# Patient Record
Sex: MALE | Race: WHITE | NOT HISPANIC OR LATINO
[De-identification: names, ages, dates, MRNs, and addresses within clinical notes are randomized per-mention and may not be internally consistent; named-entity substitution may affect disease eponyms.]

---

## 2021-06-15 ENCOUNTER — TRANSCRIPTION ENCOUNTER (OUTPATIENT)
Age: 51
End: 2021-06-15

## 2023-05-15 ENCOUNTER — APPOINTMENT (OUTPATIENT)
Dept: ORTHOPEDIC SURGERY | Facility: CLINIC | Age: 53
End: 2023-05-15
Payer: MEDICARE

## 2023-05-15 DIAGNOSIS — M25.811 OTHER SPECIFIED JOINT DISORDERS, RIGHT SHOULDER: ICD-10-CM

## 2023-05-15 DIAGNOSIS — M75.51 BURSITIS OF RIGHT SHOULDER: ICD-10-CM

## 2023-05-15 PROCEDURE — 99214 OFFICE O/P EST MOD 30 MIN: CPT | Mod: 25

## 2023-05-15 PROCEDURE — 20611 DRAIN/INJ JOINT/BURSA W/US: CPT | Mod: RT

## 2023-05-15 PROCEDURE — 99204 OFFICE O/P NEW MOD 45 MIN: CPT | Mod: 25,57

## 2023-05-15 RX ORDER — METHYLPREDNISOLONE 4 MG/1
4 TABLET ORAL
Qty: 1 | Refills: 0 | Status: ACTIVE | COMMUNITY
Start: 2023-05-15 | End: 1900-01-01

## 2023-05-15 NOTE — PHYSICAL EXAM
[de-identified] : Constitutional: The general appearance of the patient is well developed, well nourished, no deformities and well groomed. Normal\par \par Gait: Gait and function is as follows: normal gait.\par \par Skin: Head and neck visualized skin is normal. Left upper extremity visualized skin is normal. Right upper extremity visualized skin is normal. Thoracic Skin of the thoracic spine shows visualized skin is normal.\par \par Cardiovascular: palpable radial pulse bilaterally, good capillary refill in digits of the bilateral upper extremities and no temperature or color changes in the bilateral upper extremities.\par \par Lymphatic: Normal Palpation of lymph nodes in the cervical.\par \par Neurologic: fine motor control in the bilateral upper extremities is intact. Deep Tendon Reflexes in Upper and Lower Extremities Negative Delacruz's in the bilateral upper extremities. The patient is oriented to time, place and person. Sensation to light touch intact in the bilateral upper extremities. Mood and Affect is normal.\par \par Right Shoulder: Inspection of the shoulder/upper arm is as follows: no scapula winging, no biceps deformity and no AC joint deformity. Palpation of the shoulder/upper arm is as follows: There is tenderness at the proximal biceps tendon. Range of motion of the shoulder is as follows: Pain with internal rotation, external rotation, abduction and forward flexion. Strength of the shoulder is as follows: Supraspinatus 4/5. External Rotation 4/5. Internal Rotation 4/5. Deltoid 5/5 Ligament Stability and Special Tests of the shoulder is as follows: Neer test is positive. Bang' test is positive. Speed's test is positive\par \par Left Shoulder: Inspection of the shoulder/upper arm is as follows: There is a full, pain-free, stable range of motion of the shoulder with normal strength and no tenderness to palpation.\par \par Neck:\par \par Inspection / Palpation of the cervical spine is as follows: mild paracervical tenderness. Range of motion of the cervical spine is as follows: moderately decreased range of motion of the cervical spine. Stability testing for the cervical spine is as follows Stable range of motion.\par \par Back, including spine: Inspection / Palpation of the thoracic/lumbar spine is as follows: There is a full, pain free, stable range of motion of the thoracic spine with a normal tone and not tenderness to palpation.\par

## 2023-05-15 NOTE — HISTORY OF PRESENT ILLNESS
[de-identified] : This is a 53 yo male  presenting to the office c/o ongoing right shoulder pain for 4 months. No prior injury or trauma to shoulder. Pt had prior MRI done.  Pain is described as constant.  Pain will wake patient up from sleep. Patient reports pain has been getting progressively worse. Pain is worse at night. Overall pain does improve with rest and ice. Patient denies any numbness or tingling.Pt is currently in physical therapy since November but doesn't notice improvement. \par

## 2023-05-15 NOTE — PROCEDURE
[FreeTextEntry3] : Large Joint Injection was performed because of pain and inflammation.\par \par Anesthesia: ethyl chloride sprayed topically..\par \par Depomedrol: An injection of Depomedrol 40 mg , 1 cc.\par \par Lidocaine: 3 cc.\par \par Medication was injected in the right GH joint. Patient has tried OTC's including aspirin, Ibuprofen, Aleve etc or prescription NSAIDS, and/or exercises at home and/ or physical therapy without satisfactory response and Patient has decreased mobility in the joint. After verbal consent using sterile preparation and technique. The risks, benefits, and alternatives to cortisone injection were explained in full to the patient. Risks outlined include but are not limited to infection, sepsis, bleeding, scarring, skin discoloration, temporary increase in pain, syncopal episode, failure to resolve symptoms, allergic reaction, symptom recurrence, and elevation of blood sugar in diabetics. Patient understood the risks. All questions were answered. After discussion of options, patient requested an injection. Oral informed consent was obtained and sterile prep was done of the injection site. Sterile technique was utilized for the procedure including the preparation of the solutions used for the injection. Patient tolerated the procedure well. Advised to ice the injection site this evening. Prep with alcohol locally to site. Sterile technique used. Patient tolerated procedure well. Post Procedure Instructions: Patient was advised to call if redness, pain, or fever occur and apply ice for 15 min. out of every hour for the next 12-24 hours as tolerated. patient was advised to rest the joint(s) for 7 days.\par \par Ultrasound Guidance was used for the following reasons: prior failure or difficult injection.\par \par Ultrasound guided injection was performed of the shoulder, visualization of the needle and placement of injection was performed without complication.\par

## 2023-05-15 NOTE — DISCUSSION/SUMMARY
[de-identified] : RUE: Pain with max external and internal rotation limited about 10 degrees in both directions.\par \par This is a 53 yo male presenting to the office c/o ongoing right shoulder pain for 4 months\par Prior MRI reviewed as well as x-rays from [facility] showing no intrinsic shoulder pathology.\par \par ROM and strength is intact\par Capsular contracture of the shoulder seen on exam today \par MDP prescribed\par GH injection today for pain\par Follow up as needed showed sleeper stretch\par And external rotation stretch as well.\par \par \par \par (1) We discussed a comprehensive treatment plans that included a prescription management plan involving the use of prescription strength medications to include Ibuprofen 600-800 mg TID, versus 500-650 mg Tylenol. We also discussed prescribing topical diclofenac (Voltaren gel) as well as once daily Meloxicam 15 mg.\par (2) The patient has More Than One chronic injuries/illnesses as outlined, discussed, and documented by ICD 10 codes listed, as well as the HPI and Plan section.\par There is a moderate risk of morbidity with further treatment, especially from use of prescription strength medications and possible side effects of these medications which include upset stomach and cardiac/renal issues with long term use were discussed.\par (3) I recommended that the patient follow-up with their medical physician to discuss any significant specific potential issues with long term use such as interactions with current medications or with exacerbation of underlying medical morbidities. \par \par Attestation:\par I, Katlyn Sanchez , attest that this documentation has been prepared under the direction and in the presence of Provider Jose Leigh MD.\par The documentation recorded by the scribe, in my presence, accurately reflects the service I personally performed, and the decisions made by me with my edits as appropriate.\par Jose Leigh MD\par

## 2023-05-15 NOTE — ASSESSMENT
[FreeTextEntry1] : MRI RIght shoulder 3/31/23 [facility]\par cuff tendopathy \par ac joint arthrosis\par \par c spine mri\par multiple b/l stenosis

## 2023-06-30 ENCOUNTER — APPOINTMENT (OUTPATIENT)
Dept: ORTHOPEDIC SURGERY | Facility: CLINIC | Age: 53
End: 2023-06-30
Payer: MEDICARE

## 2023-06-30 DIAGNOSIS — M54.51 VERTEBROGENIC LOW BACK PAIN: ICD-10-CM

## 2023-06-30 DIAGNOSIS — M43.10 SPONDYLOLISTHESIS, SITE UNSPECIFIED: ICD-10-CM

## 2023-06-30 PROCEDURE — 99214 OFFICE O/P EST MOD 30 MIN: CPT

## 2023-06-30 RX ORDER — METHOCARBAMOL 750 MG/1
750 TABLET, FILM COATED ORAL
Qty: 30 | Refills: 1 | Status: ACTIVE | COMMUNITY
Start: 2023-06-30 | End: 1900-01-01

## 2023-06-30 RX ORDER — METHYLPREDNISOLONE 4 MG/1
4 TABLET ORAL
Qty: 1 | Refills: 0 | Status: ACTIVE | COMMUNITY
Start: 2023-06-30 | End: 1900-01-01

## 2023-07-04 NOTE — HISTORY OF PRESENT ILLNESS
[de-identified] : 06/30/2023 -  Patient presents to the office for initial evaluation of lower back pain. Patient reports a chronic history of lower back pain he has been had he has had treatment with pain management physician Dr. Oneill including lumbar epidural‘s and lumbar facet RFA. Patient’s most recent lumbar MRIs approximately two years ago. In the last month he’s had an increase in low back pain without significant radiation to bilateral lower extremities. Patient reports increased pain with forward flexion and getting up from seated position.  He tried physical therapy without benefit and he’s taking Advil with modest benefit far.  Patient denies fevers, acute weakness, unexpected weight loss, urinary incontinence, and bowel incontinence.\par \par \par Subjective Weakness: No \par Numbness/Tingling: no\par Bladder/Bowel dysfunction: no\par Gait Abnormalities: no\par Fine motor coordination changes:  no\par \par  \par Injections: Yes- left shoulder CSI - Mallo\par Yadegar- EZ/RFA  \par \par Pertinent Surgical History: N/A \par \par  \par  [FreeTextEntry5] : The patient is a 52 year male who presents today complaining of low back pain radiating into right hip\par Date of Injury/Onset: chronic\par Pain:    At Rest: 8/10 \par With Activity:  10/10 \par Mechanism of injury: no injury\par Quality of symptoms: not constant, sharp, burning\par Improves with: stretching\par Worse with: bending, prolonged sitting, transition from sitting to standing\par Prior treatment: epidural injections in the past, physical therapy, ibuprofen\par Prior Imaging: stand up mri lumbar spine\par Additional Information: \par

## 2023-07-04 NOTE — PHYSICAL EXAM
[Normal Coordination] : normal coordination [Normal DTR UE/LE] : normal DTR UE/LE  [Normal Sensation] : normal sensation [Normal Mood and Affect] : normal mood and affect [Orientated] : orientated [Able to Communicate] : able to communicate [Normal Skin] : normal skin [No Rash] : no rash [No Ulcers] : no ulcers [No Lesions] : no lesions [No obvious lymphadenopathy in areas examined] : no obvious lymphadenopathy in areas examined [Well Developed] : well developed [Peripheral vascular exam is grossly normal] : peripheral vascular exam is grossly normal [No Respiratory Distress] : no respiratory distress [de-identified] : Constitutional:  \par - No acute distress  \par - Well developed; well nourished  \par   \par Neurological:  \par - normal mood and affect  \par - alert and oriented x 3   \par   \par Cardiovascular:  \par - grossly normal\par \par \par Cervical Spine Exam: \par \par Inspection: \par   \par erythema (-)  \par ecchymosis (-)  \par rashes (-)  \par   \par Palpation:   \par Cervical paraspinal tenderness:     	R (-); L(-) \par Upper trapezius tenderness:          	R (-); L (-) \par Rhomboids tenderness:                  	R (-); L (-) \par Occipital Ridge:                                	R (-); L (-) \par Supraspinatus tenderness:             	R (-); L (-) \par   \par ROM:   \par ROM - full range of motion with mild stiffness \par Pain with  extension \par   \par Strength Testing:        	Right	Left \par Deltoid                             (5/5)    (5/5) \par Biceps:                            (5/5)    (5/5) \par Triceps:                           (5/5)    (5/5) \par Finger Abductors:           (5/5)    (5/5) \par Grasp:                             (5/5)    (5/5) \par   \par Special Testing: \par Spurling Test:              	R (-); L (-) \par Facet load test:           	R (-); L (-) \par Hoffmans:                               R (-); L(-)\par   \par Neuro: \par SILT throughout right upper extremity \par SILT throughout left upper extremity \par   \par Reflexes: \par Biceps   -       	R (2+); L (2+) \par Triceps  -       	R (2+); L (2+) \par Brachioradialis- R (2+); L (2+) \par   \par No ankle clonus\par \par   \par \par Lumbar Spine Exam: \par \par Inspection: \par erythema (-) \par ecchymosis (-) \par rashes (-) \par alignment: no scoliosis \par   \par Palpation: \par Midline lumbar tenderness:             (-) \par midline thoracic tenderness:          (-) \par Lumbar paraspinal tenderness:  	L (+) ; R (+) \par thoracic paraspinal tenderness:	L (-) ; R (-) \par sciatic nerve tenderness :         	L (-) ; R (-) \par SI joint tenderness:                    	L (-) ; R (-) \par GTB tenderness:                        	L (-);  R (-) \par   \par ROM:   stiffness in all planes\par flexion > extension pain\par   \par Strength: \par                                	Right   	Left    \par Hip Flexion:                (5/5)       (5/5) \par Quadriceps:               (5/5)       (5/5) \par Hamstrings:                (5/5)       (5/5) \par Ankle Dorsiflexion:    (5/5)       (5/5) \par EHL:                            (5/5)       (5/5) \par Ankle Plantarflexion:  (5/5)       (5/5) \par   \par Special Tests: \par SLR:                        	R (Eq) ; L (Eq) \par Facet loading:        	R (+) ; L (+) \par LAURA test:            	R (-) ; L (-) \par Hamstring tightness:  R (-);  L (-) \par   \par Neurologic: \par SILT throughout right lower extremity \par SILT throughout left lower extremity \par   \par Reflexes normal and symmetric bilateral lower extremities \par   \par Gait: \par non- antalgic gait \par ambulates without assistive device \par \par

## 2023-07-04 NOTE — ASSESSMENT
[FreeTextEntry1] : This is a 52 year old male with chronic back pain.  MRI lumbar spine from 2021 showing retrolisthesis, lumbar ddd, and modic changes. This patient will continue PT directed at his lumbar spine.  He can obtain new MRI lumbar spine to eval progression of Modic changes.  We discussed treatment in the forms of lumbar EZ, repeat lumbar RFA, and Dr. Solano referral for BVN candidacy for vertebrogenic back pain component. Prescribed methocarbamol and MDP.  FUV for MRI review. \par \par Explained intended effects, potential side effects, and schedule of dosages of the medication.  Discussed red flag signs and symptoms of worsening condition, when to call the office, and when to seek higher level of care.\par \par Prior to appointment and during encounter with patient extensive medical records were reviewed including but not limited to, hospital records, outpatient records, imaging results, and lab data. During this appointment the patient was examined, diagnoses were discussed and explained in a face to face manner. In addition extensive time was spent reviewing aforementioned diagnostic studies. Counseling including abnormal image results, differential diagnoses, treatment options, risk and benefits, lifestyle changes, current condition, and current medications was performed. Patient's comments, questions, and concerns were addressed and patient verbalized understanding. Based on this patient's presentation at our office, which is an orthopedic spine surgeon's office, this patient inherently / intrinsically has a risk, however minute, of developing issues such as Cauda equina syndrome, bowel and bladder changes, or progression of motor or neurological deficits such as paralysis which may be permanent.\par \par IJasen, personally performed the services described in this documentation incident to Matthew Morfin MD. All medical record entries are made by the scribe were at my direction and in my presence.\par

## 2023-08-04 ENCOUNTER — APPOINTMENT (OUTPATIENT)
Dept: ORTHOPEDIC SURGERY | Facility: CLINIC | Age: 53
End: 2023-08-04
Payer: MEDICARE

## 2023-08-04 VITALS — HEIGHT: 67 IN

## 2023-08-04 DIAGNOSIS — M54.16 RADICULOPATHY, LUMBAR REGION: ICD-10-CM

## 2023-08-04 DIAGNOSIS — M47.816 SPONDYLOSIS W/OUT MYELOPATHY OR RADICULOPATHY, LUMBAR REGION: ICD-10-CM

## 2023-08-04 PROCEDURE — 99214 OFFICE O/P EST MOD 30 MIN: CPT

## 2023-08-04 RX ORDER — METHOCARBAMOL 750 MG/1
750 TABLET, FILM COATED ORAL
Qty: 30 | Refills: 1 | Status: ACTIVE | COMMUNITY
Start: 2023-08-04 | End: 1900-01-01

## 2023-08-05 PROBLEM — M47.816 LUMBAR SPONDYLOSIS: Status: ACTIVE | Noted: 2023-06-30

## 2023-08-05 PROBLEM — M54.16 LUMBAR RADICULOPATHY: Status: ACTIVE | Noted: 2023-06-30

## 2023-08-05 NOTE — HISTORY OF PRESENT ILLNESS
[de-identified] : 08/04/2023 - Patient presenting for an MRI Review of L spine. Severeity of pain is reduced since he was last seen. Has been treating with meds (methocarbamol and MDP) and PT which both have been helpful. He reports improvements in overall ROM and back pain is stable at this time. also complaints of pain in the RT quad/buttock, does not extend throughout entire leg. Locking in low back improved.   06/30/2023 -  Patient presents to the office for initial evaluation of lower back pain. Patient reports a chronic history of lower back pain he has been had he has had treatment with pain management physician Dr. Oneill including lumbar epidural's and lumbar facet RFA. Patient's most recent lumbar MRIs approximately two years ago. In the last month he's had an increase in low back pain without significant radiation to bilateral lower extremities. Patient reports increased pain with forward flexion and getting up from seated position.  He tried physical therapy without benefit and he's taking Advil with modest benefit far.  Patient denies fevers, acute weakness, unexpected weight loss, urinary incontinence, and bowel incontinence.   Subjective Weakness: No  Numbness/Tingling: no Bladder/Bowel dysfunction: no Gait Abnormalities: no Fine motor coordination changes:  no    Injections: Yes- left shoulder CSI - Lu Parson- EZ/RFA    Pertinent Surgical History: N/A      [FreeTextEntry5] : The patient is a 52 year male who presents today complaining of low back pain radiating into right hip\par  Date of Injury/Onset: chronic\par  Pain:    At Rest: 8/10 \par  With Activity:  10/10 \par  Mechanism of injury: no injury\par  Quality of symptoms: not constant, sharp, burning\par  Improves with: stretching\par  Worse with: bending, prolonged sitting, transition from sitting to standing\par  Prior treatment: epidural injections in the past, physical therapy, ibuprofen\par  Prior Imaging: stand up mri lumbar spine\par  Additional Information: \par

## 2023-08-05 NOTE — ASSESSMENT
[FreeTextEntry1] : This is a 52 year old male with chronic back pain.   MRI lumbar spine reveals L3/4 retrolisthesis with instability. At this time, patient will continue his current PT trial, counseled on routine core strengthening exercises. Continue treatment with methocarbamol to be used as needed at nighttime, renewal provided. Symptoms are not functionally incapacitating enough for interventional spinal injections at this time. Possible PM consult in the future. FUV 6 WKS.   Explained intended effects, potential side effects, and schedule of dosages of the medication.  Discussed red flag signs and symptoms of worsening condition, when to call the office, and when to seek higher level of care.  Prior to appointment and during encounter with patient extensive medical records were reviewed including but not limited to, hospital records, outpatient records, imaging results, and lab data. During this appointment the patient was examined, diagnoses were discussed and explained in a face to face manner. In addition extensive time was spent reviewing aforementioned diagnostic studies. Counseling including abnormal image results, differential diagnoses, treatment options, risk and benefits, lifestyle changes, current condition, and current medications was performed. Patient's comments, questions, and concerns were addressed and patient verbalized understanding. Based on this patient's presentation at our office, which is an orthopedic spine surgeon's office, this patient inherently / intrinsically has a risk, however minute, of developing issues such as Cauda equina syndrome, bowel and bladder changes, or progression of motor or neurological deficits such as paralysis which may be permanent.  NIECY URENA Acting as a Scribe for Niecy Tidwell, attest that this documentation has been prepared under the direction and in the presence of Provider Matthew Morfin MD.

## 2023-08-05 NOTE — PHYSICAL EXAM
[Normal Coordination] : normal coordination [Normal DTR UE/LE] : normal DTR UE/LE  [Normal Sensation] : normal sensation [Normal Mood and Affect] : normal mood and affect [Orientated] : orientated [Able to Communicate] : able to communicate [Normal Skin] : normal skin [No Rash] : no rash [No Ulcers] : no ulcers [No Lesions] : no lesions [No obvious lymphadenopathy in areas examined] : no obvious lymphadenopathy in areas examined [Well Developed] : well developed [Peripheral vascular exam is grossly normal] : peripheral vascular exam is grossly normal [No Respiratory Distress] : no respiratory distress [de-identified] : Constitutional:  \par  - No acute distress  \par  - Well developed; well nourished  \par    \par  Neurological:  \par  - normal mood and affect  \par  - alert and oriented x 3   \par    \par  Cardiovascular:  \par  - grossly normal\par  \par  \par  Cervical Spine Exam: \par  \par  Inspection: \par    \par  erythema (-)  \par  ecchymosis (-)  \par  rashes (-)  \par    \par  Palpation:   \par  Cervical paraspinal tenderness:     	R (-); L(-) \par  Upper trapezius tenderness:          	R (-); L (-) \par  Rhomboids tenderness:                  	R (-); L (-) \par  Occipital Ridge:                                	R (-); L (-) \par  Supraspinatus tenderness:             	R (-); L (-) \par    \par  ROM:   \par  ROM - full range of motion with mild stiffness \par  Pain with  extension \par    \par  Strength Testing:        	Right	Left \par  Deltoid                             (5/5)    (5/5) \par  Biceps:                            (5/5)    (5/5) \par  Triceps:                           (5/5)    (5/5) \par  Finger Abductors:           (5/5)    (5/5) \par  Grasp:                             (5/5)    (5/5) \par    \par  Special Testing: \par  Spurling Test:              	R (-); L (-) \par  Facet load test:           	R (-); L (-) \par  Hoffmans:                               R (-); L(-)\par    \par  Neuro: \par  SILT throughout right upper extremity \par  SILT throughout left upper extremity \par    \par  Reflexes: \par  Biceps   -       	R (2+); L (2+) \par  Triceps  -       	R (2+); L (2+) \par  Brachioradialis- R (2+); L (2+) \par    \par  No ankle clonus\par  \par    \par  \par  Lumbar Spine Exam: \par  \par  Inspection: \par  erythema (-) \par  ecchymosis (-) \par  rashes (-) \par  alignment: no scoliosis \par    \par  Palpation: \par  Midline lumbar tenderness:             (-) \par  midline thoracic tenderness:          (-) \par  Lumbar paraspinal tenderness:  	L (+) ; R (+) \par  thoracic paraspinal tenderness:	L (-) ; R (-) \par  sciatic nerve tenderness :         	L (-) ; R (-) \par  SI joint tenderness:                    	L (-) ; R (-) \par  GTB tenderness:                        	L (-);  R (-) \par    \par  ROM:   stiffness in all planes\par  flexion > extension pain\par    \par  Strength: \par                                 	Right   	Left    \par  Hip Flexion:                (5/5)       (5/5) \par  Quadriceps:               (5/5)       (5/5) \par  Hamstrings:                (5/5)       (5/5) \par  Ankle Dorsiflexion:    (5/5)       (5/5) \par  EHL:                            (5/5)       (5/5) \par  Ankle Plantarflexion:  (5/5)       (5/5) \par    \par  Special Tests: \par  SLR:                        	R (Eq) ; L (Eq) \par  Facet loading:        	R (+) ; L (+) \par  LAURA test:            	R (-) ; L (-) \par  Hamstring tightness:  R (-);  L (-) \par    \par  Neurologic: \par  SILT throughout right lower extremity \par  SILT throughout left lower extremity \par    \par  Reflexes normal and symmetric bilateral lower extremities \par    \par  Gait: \par  non- antalgic gait \par  ambulates without assistive device \par  \par   [Extension] : extension [] : achilles and patella reflexes are symmetrical

## 2024-05-02 ENCOUNTER — NON-APPOINTMENT (OUTPATIENT)
Age: 54
End: 2024-05-02

## 2024-05-02 DIAGNOSIS — K31.9 DISEASE OF STOMACH AND DUODENUM, UNSPECIFIED: ICD-10-CM

## 2024-05-02 DIAGNOSIS — S93.699A OTHER SPRAIN OF UNSPECIFIED FOOT, INITIAL ENCOUNTER: ICD-10-CM

## 2024-05-02 RX ORDER — DICLOFENAC SODIUM 100 MG
TABLET, EXTENDED RELEASE 24 HR ORAL
Refills: 0 | Status: ACTIVE | COMMUNITY

## 2024-05-02 RX ORDER — ESOMEPRAZOLE MAGNESIUM 40 MG/1
CAPSULE, DELAYED RELEASE ORAL
Refills: 0 | Status: ACTIVE | COMMUNITY

## 2025-07-09 ENCOUNTER — NON-APPOINTMENT (OUTPATIENT)
Age: 55
End: 2025-07-09

## 2025-07-09 ENCOUNTER — APPOINTMENT (OUTPATIENT)
Dept: PODIATRY | Facility: CLINIC | Age: 55
End: 2025-07-09

## 2025-07-09 PROBLEM — M77.51 CAPSULITIS OF METATARSOPHALANGEAL (MTP) JOINT OF RIGHT FOOT: Status: ACTIVE | Noted: 2025-07-09

## 2025-07-09 PROBLEM — M77.41 METATARSALGIA OF RIGHT FOOT: Status: ACTIVE | Noted: 2025-07-09

## 2025-07-09 PROCEDURE — 73620 X-RAY EXAM OF FOOT: CPT | Mod: 59,LT

## 2025-07-09 PROCEDURE — 99203 OFFICE O/P NEW LOW 30 MIN: CPT | Mod: 25

## 2025-07-09 PROCEDURE — 73630 X-RAY EXAM OF FOOT: CPT | Mod: 59,RT

## 2025-07-09 RX ORDER — GABAPENTIN 300 MG
300 TABLET ORAL
Refills: 0 | Status: ACTIVE | COMMUNITY

## 2025-07-09 RX ORDER — DICLOFENAC SODIUM 100 MG/1
100 TABLET, FILM COATED, EXTENDED RELEASE ORAL DAILY
Qty: 14 | Refills: 0 | Status: ACTIVE | COMMUNITY
Start: 2025-07-09 | End: 1900-01-01

## 2025-07-31 PROBLEM — Z78.9 SOCIAL ALCOHOL USE: Status: ACTIVE | Noted: 2025-07-09

## 2025-07-31 PROBLEM — Z78.9 NON-SMOKER: Status: ACTIVE | Noted: 2025-07-09

## 2025-07-31 PROBLEM — G47.30 SLEEP APNEA: Status: ACTIVE | Noted: 2025-07-09

## 2025-07-31 PROBLEM — M19.90 ARTHRITIS: Status: ACTIVE | Noted: 2025-07-09

## 2025-07-31 PROBLEM — Z80.6 FAMILY HISTORY OF LEUKEMIA: Status: ACTIVE | Noted: 2025-07-09

## 2025-08-01 PROBLEM — M79.673 FOOT PAIN: Status: ACTIVE | Noted: 2025-08-01
